# Patient Record
Sex: MALE | Race: ASIAN | NOT HISPANIC OR LATINO
[De-identification: names, ages, dates, MRNs, and addresses within clinical notes are randomized per-mention and may not be internally consistent; named-entity substitution may affect disease eponyms.]

---

## 2021-05-14 ENCOUNTER — APPOINTMENT (OUTPATIENT)
Dept: CARDIOLOGY | Facility: CLINIC | Age: 21
End: 2021-05-14
Payer: MEDICAID

## 2021-05-14 VITALS
DIASTOLIC BLOOD PRESSURE: 77 MMHG | HEART RATE: 112 BPM | WEIGHT: 122 LBS | RESPIRATION RATE: 18 BRPM | OXYGEN SATURATION: 97 % | TEMPERATURE: 97.5 F | HEIGHT: 67.32 IN | SYSTOLIC BLOOD PRESSURE: 109 MMHG | BODY MASS INDEX: 18.93 KG/M2

## 2021-05-14 DIAGNOSIS — R07.89 OTHER CHEST PAIN: ICD-10-CM

## 2021-05-14 DIAGNOSIS — R07.9 CHEST PAIN, UNSPECIFIED: ICD-10-CM

## 2021-05-14 DIAGNOSIS — E78.5 HYPERLIPIDEMIA, UNSPECIFIED: ICD-10-CM

## 2021-05-14 DIAGNOSIS — Z78.9 OTHER SPECIFIED HEALTH STATUS: ICD-10-CM

## 2021-05-14 PROBLEM — Z00.00 ENCOUNTER FOR PREVENTIVE HEALTH EXAMINATION: Status: ACTIVE | Noted: 2021-05-14

## 2021-05-14 PROCEDURE — 99072 ADDL SUPL MATRL&STAF TM PHE: CPT

## 2021-05-14 PROCEDURE — 93306 TTE W/DOPPLER COMPLETE: CPT

## 2021-05-14 PROCEDURE — 99204 OFFICE O/P NEW MOD 45 MIN: CPT | Mod: 25

## 2021-05-14 PROCEDURE — 93015 CV STRESS TEST SUPVJ I&R: CPT

## 2021-08-18 NOTE — HISTORY OF PRESENT ILLNESS
[FreeTextEntry1] : 20 year-old male with no significant PMH presents for evaluation of CP.  Patient reports that for the past year he has been experiencing left-sided CP, described as sharp pain, not related to exertion, non-tender to touch, lasting 30 seconds to a miin, occurring about every 2 weeks.  Patient denies SOB.  Patient denies palpitations.  Patient denies h/o syncope but reports one episode of near-syncope 3 years ago after blood draw. I advised patient to undergo an echocardiogram and a treadmill stress test.